# Patient Record
Sex: MALE | Race: ASIAN | NOT HISPANIC OR LATINO | URBAN - METROPOLITAN AREA
[De-identification: names, ages, dates, MRNs, and addresses within clinical notes are randomized per-mention and may not be internally consistent; named-entity substitution may affect disease eponyms.]

---

## 2021-07-19 ENCOUNTER — WEB ENCOUNTER (OUTPATIENT)
Dept: URBAN - METROPOLITAN AREA CLINIC 37 | Facility: CLINIC | Age: 66
End: 2021-07-19

## 2021-07-29 ENCOUNTER — OFFICE VISIT (OUTPATIENT)
Dept: URBAN - METROPOLITAN AREA CLINIC 35 | Facility: CLINIC | Age: 66
End: 2021-07-29

## 2021-07-29 VITALS
DIASTOLIC BLOOD PRESSURE: 80 MMHG | HEART RATE: 82 BPM | WEIGHT: 145 LBS | SYSTOLIC BLOOD PRESSURE: 130 MMHG | HEIGHT: 67 IN | BODY MASS INDEX: 22.76 KG/M2 | OXYGEN SATURATION: 98 %

## 2021-07-29 RX ORDER — ATORVASTATIN CALCIUM 40 MG/1
1 TABLET TABLET, FILM COATED ORAL ONCE A DAY
Qty: 30 | Status: ACTIVE | COMMUNITY

## 2021-07-29 RX ORDER — PANTOPRAZOLE SODIUM 40 MG/1
1 TABLET TABLET, DELAYED RELEASE ORAL ONCE A DAY
Qty: 30 | Status: ACTIVE | COMMUNITY

## 2021-07-29 RX ORDER — LOSARTAN POTASSIUM 50 MG/1
1 TABLET TABLET ORAL ONCE A DAY
Qty: 30 | Status: ACTIVE | COMMUNITY

## 2021-07-29 RX ORDER — MELOXICAM 15 MG/1
1 TABLET TABLET ORAL ONCE A DAY
Qty: 30 | Status: ON HOLD | COMMUNITY

## 2021-07-29 RX ORDER — SODIUM, POTASSIUM,MAG SULFATES 17.5-3.13G
ML AS DIRECTED SOLUTION, RECONSTITUTED, ORAL ORAL
Qty: 1 KIT | Refills: 0 | OUTPATIENT
Start: 2021-07-29

## 2021-07-29 RX ORDER — SUVOREXANT 20 MG/1
1 TABLET AT BEDTIME AS NEEDED TABLET, FILM COATED ORAL ONCE A DAY
Status: ACTIVE | COMMUNITY

## 2021-07-29 RX ORDER — KRILL/OM-3/DHA/EPA/PHOSPHO/AST 1000-230MG
1 TABLET CAPSULE ORAL ONCE A DAY
Qty: 30 | Status: ACTIVE | COMMUNITY

## 2021-07-29 NOTE — HPI-MIGRATED HPI
;   ;     Colorectal Cancer Screening : 66 y/o male patient presents today for a consultation for a colorectal cancer screening. Patient admits this is his  first colonoscopy due to age.  Patient currently admits 1 formed bowel movements per day with no melena, mucus  or blood in stools.  Patient denies abdominal pain, bloating, gas, or  heartburn.  Patient denies having a colonoscopy/EGD in past. Patient denies a family hx of colon, gastric, or esophageal cancer/polyps.  ;   Epigastric Pain : 66 y/o male patient presents today with intermittent epigastric pain. Patient notes that it occurs infrequently with burning sensation. Patient notes it coincides with excessive belching when it occurs. Patient admits taking Pantoprazole 40 mg with relief of symptoms. Patient admits to 1 formed bowel movements per day, with no mucus, melena, or blood in stool.  Patient denies bloating/gas, indigestion, or changes in bowel habits. Patient denies associated nausea, vomiting, fever, chills, dizziness,  dysphagia, globus, sour eructations,  early satiety, changes in appetite, coughing.  Patient denies past EGD performed. Patient denies any family history of colonic cancer/disease/polyps.  ;

## 2021-07-30 ENCOUNTER — TELEPHONE ENCOUNTER (OUTPATIENT)
Dept: URBAN - METROPOLITAN AREA CLINIC 35 | Facility: CLINIC | Age: 66
End: 2021-07-30

## 2021-08-03 ENCOUNTER — OFFICE VISIT (OUTPATIENT)
Dept: URBAN - METROPOLITAN AREA SURGERY CENTER 8 | Facility: SURGERY CENTER | Age: 66
End: 2021-08-03

## 2021-08-19 ENCOUNTER — OFFICE VISIT (OUTPATIENT)
Dept: URBAN - METROPOLITAN AREA CLINIC 35 | Facility: CLINIC | Age: 66
End: 2021-08-19

## 2021-09-08 ENCOUNTER — OFFICE VISIT (OUTPATIENT)
Dept: URBAN - METROPOLITAN AREA MEDICAL CENTER 10 | Facility: MEDICAL CENTER | Age: 66
End: 2021-09-08

## 2021-09-23 ENCOUNTER — OFFICE VISIT (OUTPATIENT)
Dept: URBAN - METROPOLITAN AREA CLINIC 35 | Facility: CLINIC | Age: 66
End: 2021-09-23

## 2021-09-23 ENCOUNTER — TELEPHONE ENCOUNTER (OUTPATIENT)
Dept: URBAN - METROPOLITAN AREA CLINIC 35 | Facility: CLINIC | Age: 66
End: 2021-09-23

## 2021-09-23 VITALS
SYSTOLIC BLOOD PRESSURE: 126 MMHG | HEIGHT: 67 IN | OXYGEN SATURATION: 99 % | DIASTOLIC BLOOD PRESSURE: 76 MMHG | HEART RATE: 83 BPM | BODY MASS INDEX: 22.91 KG/M2 | WEIGHT: 146 LBS

## 2021-09-23 PROBLEM — 196731005 GASTRODUODENITIS: Status: ACTIVE | Noted: 2021-09-23

## 2021-09-23 PROBLEM — 302914006 BARRETT'S ESOPHAGUS: Status: ACTIVE | Noted: 2021-09-23

## 2021-09-23 RX ORDER — PANTOPRAZOLE SODIUM 40 MG/1
1 TABLET TABLET, DELAYED RELEASE ORAL ONCE A DAY
Qty: 30 | Status: ACTIVE | COMMUNITY

## 2021-09-23 RX ORDER — ATORVASTATIN CALCIUM 40 MG/1
1 TABLET TABLET, FILM COATED ORAL ONCE A DAY
Qty: 30 | Status: ACTIVE | COMMUNITY

## 2021-09-23 RX ORDER — LOSARTAN POTASSIUM 50 MG/1
1 TABLET TABLET ORAL ONCE A DAY
Qty: 30 | Status: ACTIVE | COMMUNITY

## 2021-09-23 RX ORDER — METRONIDAZOLE 500 MG/1
1 TABLET TABLET, FILM COATED ORAL THREE TIMES A DAY
Qty: 42 TABLET | Refills: 0 | OUTPATIENT
Start: 2021-09-23

## 2021-09-23 RX ORDER — AMOXICILLIN 500 MG/1
2 CAPSULES CAPSULE ORAL TWICE A DAY
Qty: 56 CAPSULE | Refills: 0 | OUTPATIENT
Start: 2021-09-23

## 2021-09-23 RX ORDER — KRILL/OM-3/DHA/EPA/PHOSPHO/AST 1000-230MG
1 TABLET CAPSULE ORAL ONCE A DAY
Qty: 30 | Status: ACTIVE | COMMUNITY

## 2021-09-23 RX ORDER — OMEPRAZOLE 20 MG/1
1 CAPSULE 30 MINUTES BEFORE MORNING MEAL CAPSULE, DELAYED RELEASE ORAL TWICE A DAY
Qty: 60 | Refills: 0 | OUTPATIENT
Start: 2021-09-23

## 2021-09-23 RX ORDER — MELOXICAM 15 MG/1
1 TABLET TABLET ORAL ONCE A DAY
Qty: 30 | Status: ON HOLD | COMMUNITY

## 2021-09-23 RX ORDER — SUVOREXANT 20 MG/1
1 TABLET AT BEDTIME AS NEEDED TABLET, FILM COATED ORAL ONCE A DAY
Status: ACTIVE | COMMUNITY

## 2021-09-23 RX ORDER — SODIUM, POTASSIUM,MAG SULFATES 17.5-3.13G
ML AS DIRECTED SOLUTION, RECONSTITUTED, ORAL ORAL
Qty: 1 KIT | Refills: 0 | Status: ACTIVE | COMMUNITY
Start: 2021-07-29

## 2021-09-23 RX ORDER — CLARITHROMYCIN 500 MG/1
1 TABLET TABLET, FILM COATED ORAL
Qty: 28 TABLET | Refills: 0 | OUTPATIENT
Start: 2021-09-23

## 2021-09-23 RX ORDER — OMEPRAZOLE 40 MG/1
1 CAPSULE CAPSULE, DELAYED RELEASE ORAL
Qty: 30 | Refills: 6 | OUTPATIENT
Start: 2021-09-23

## 2021-09-23 NOTE — HPI-MIGRATED HPI
;   ;   ;   ;     Follow up- EGD : Patient presents today for follow up to his EGD which was completed on   (09/08/2021)  by Dr. Dario Sadler.  Patient denies any complications after his procedure. Since the procedure, the patient denies dysphagia, heartburn, globus, changes in appetite, abdomen pain and changes in bowel habits. Patient admits epigastric pain up until patient consumes food. Patient usually do not consume any food until 3pm. Patient drinks morning coffee.  EGD report shows:  -LA Grade B Reflux esophagitis, biopsied.  -Gastritis, biopsied -Normal examined duodenum, biopsied.   ;   Colonoscopy Follow-Up : Patient presents today for follow up to his colonoscopy which was completed on   (09/08/2021) by Dr. Dario Sadler.  Patient denies any complications after his/her procedure. Patient currently admits 1 formed bowel movements per day.  Patient denies any melena, blood or mucus in stools. Patient denies any associated abdominal pain, heartburn, bloating, or gas.   Colonoscopy report shows:  -Preparation of the colon was fair -Four 4 to 6mm polyps in the rectum, removed with a cold snare. Resected and retrieved.  -One 7 mm polyp in the distal descending colon, removed with a cold snare. Resected and retrieved.  -One 5mm polyp in the proximal descending colon, removed with a cold snare. Resected and retrieved.  -The examination was otherwise normal on direct and retroflexion views .  ;   Colorectal Cancer Screening : (Last visit 07/29/2021) 64 y/o male patient presents today for a consultation for a colorectal cancer screening. Patient admits this is his  first colonoscopy due to age.  Patient currently admits 1 formed bowel movements per day with no melena, mucus  or blood in stools.  Patient denies abdominal pain, bloating, gas, or  heartburn.  Patient denies having a colonoscopy/EGD in past. Patient denies a family hx of colon, gastric, or esophageal cancer/polyps.;   Epigastric Pain : 64 y/o male patient presents today with epigastric pain only when patient is on a empty stomach.  Patient states he normally does not eat until late afternoon. Patient describes that he does not feel hungry before 3pm.  The exacerbating factor is not eating. The relieving factor is when patient consumes food the patient feels better. Patient admits to 1 formed bowel movements per day, with no mucus, melena, or blood in stool.  Patient denies bloating/gas, indigestion, or changes in bowel habits. Patient denies associated nausea, vomiting, fever, chills, dizziness,  dysphagia, globus, sour eructations,  early satiety, changes in appetite, coughing.      (Last Visit 7/29/21) 64 y/o male patient presents today with intermittent epigastric pain. Patient notes that it occurs infrequently with burning sensation. Patient notes it coincides with excessive belching when it occurs. Patient admits taking Pantoprazole 40 mg with relief of symptoms. Patient admits to 1 formed bowel movements per day, with no mucus, melena, or blood in stool.  Patient denies bloating/gas, indigestion, or changes in bowel habits. Patient denies associated nausea, vomiting, fever, chills, dizziness,  dysphagia, globus, sour eructations,  early satiety, changes in appetite, coughing.  Patient denies past EGD performed. Patient denies any family history of colonic cancer/disease/polyps.;

## 2021-09-28 ENCOUNTER — TELEPHONE ENCOUNTER (OUTPATIENT)
Dept: URBAN - METROPOLITAN AREA CLINIC 35 | Facility: CLINIC | Age: 66
End: 2021-09-28

## 2021-09-28 RX ORDER — CLARITHROMYCIN 500 MG/1
1 TABLET TABLET, FILM COATED ORAL
Qty: 28 TABLET | Refills: 0 | OUTPATIENT
Start: 2021-09-23

## 2021-09-28 RX ORDER — OMEPRAZOLE 20 MG/1
1 CAPSULE 30 MINUTES BEFORE MORNING MEAL CAPSULE, DELAYED RELEASE ORAL TWICE A DAY
Qty: 28 | Refills: 0 | OUTPATIENT
Start: 2021-09-23

## 2021-09-28 RX ORDER — AMOXICILLIN 500 MG/1
2 CAPSULES CAPSULE ORAL TWICE A DAY
Qty: 56 CAPSULE | Refills: 0 | OUTPATIENT
Start: 2021-09-23

## 2022-03-17 ENCOUNTER — DASHBOARD ENCOUNTERS (OUTPATIENT)
Age: 67
End: 2022-03-17

## 2022-03-17 ENCOUNTER — OFFICE VISIT (OUTPATIENT)
Dept: URBAN - METROPOLITAN AREA CLINIC 35 | Facility: CLINIC | Age: 67
End: 2022-03-17
Payer: COMMERCIAL

## 2022-03-17 ENCOUNTER — LAB OUTSIDE AN ENCOUNTER (OUTPATIENT)
Dept: URBAN - METROPOLITAN AREA CLINIC 35 | Facility: CLINIC | Age: 67
End: 2022-03-17

## 2022-03-17 ENCOUNTER — TELEPHONE ENCOUNTER (OUTPATIENT)
Dept: URBAN - METROPOLITAN AREA CLINIC 36 | Facility: CLINIC | Age: 67
End: 2022-03-17

## 2022-03-17 VITALS
SYSTOLIC BLOOD PRESSURE: 130 MMHG | BODY MASS INDEX: 23.54 KG/M2 | HEIGHT: 67 IN | OXYGEN SATURATION: 96 % | HEART RATE: 76 BPM | WEIGHT: 150 LBS | DIASTOLIC BLOOD PRESSURE: 88 MMHG

## 2022-03-17 DIAGNOSIS — K22.70 BARRETT'S ESOPHAGUS WITHOUT DYSPLASIA: ICD-10-CM

## 2022-03-17 PROCEDURE — 99213 OFFICE O/P EST LOW 20 MIN: CPT | Performed by: INTERNAL MEDICINE

## 2022-03-17 RX ORDER — METRONIDAZOLE 500 MG/1
1 TABLET TABLET, FILM COATED ORAL THREE TIMES A DAY
Qty: 42 TABLET | Refills: 0 | Status: ON HOLD | COMMUNITY
Start: 2021-09-23

## 2022-03-17 RX ORDER — CLARITHROMYCIN 500 MG/1
1 TABLET TABLET, FILM COATED ORAL
Qty: 28 TABLET | Refills: 0 | Status: ON HOLD | COMMUNITY
Start: 2021-09-23

## 2022-03-17 RX ORDER — SUVOREXANT 20 MG/1
1 TABLET AT BEDTIME AS NEEDED TABLET, FILM COATED ORAL ONCE A DAY
Status: ACTIVE | COMMUNITY

## 2022-03-17 RX ORDER — LOSARTAN POTASSIUM 50 MG/1
1 TABLET TABLET ORAL ONCE A DAY
Qty: 30 | Status: ACTIVE | COMMUNITY

## 2022-03-17 RX ORDER — OMEPRAZOLE 20 MG/1
1 CAPSULE 30 MINUTES BEFORE MORNING MEAL CAPSULE, DELAYED RELEASE ORAL TWICE A DAY
Qty: 28 | Refills: 0 | Status: ON HOLD | COMMUNITY
Start: 2021-09-23

## 2022-03-17 RX ORDER — OMEPRAZOLE 40 MG/1
1 CAPSULE CAPSULE, DELAYED RELEASE ORAL
Qty: 30 | Refills: 6 | Status: ON HOLD | COMMUNITY
Start: 2021-09-23

## 2022-03-17 RX ORDER — ATORVASTATIN CALCIUM 40 MG/1
1 TABLET TABLET, FILM COATED ORAL ONCE A DAY
Qty: 30 | Status: ACTIVE | COMMUNITY

## 2022-03-17 RX ORDER — PANTOPRAZOLE SODIUM 40 MG/1
1 TABLET TABLET, DELAYED RELEASE ORAL ONCE A DAY
Qty: 30 | Status: ON HOLD | COMMUNITY

## 2022-03-17 RX ORDER — OMEPRAZOLE 40 MG/1
1 CAPSULE 30 MINUTES BEFORE MORNING MEAL CAPSULE, DELAYED RELEASE ORAL ONCE A DAY
Qty: 30 | Refills: 11 | OUTPATIENT
Start: 2022-03-17

## 2022-03-17 RX ORDER — MELOXICAM 15 MG/1
1 TABLET TABLET ORAL ONCE A DAY
Qty: 30 | Status: ON HOLD | COMMUNITY

## 2022-03-17 RX ORDER — AMOXICILLIN 500 MG/1
2 CAPSULES CAPSULE ORAL TWICE A DAY
Qty: 56 CAPSULE | Refills: 0 | Status: ON HOLD | COMMUNITY
Start: 2021-09-23

## 2022-03-17 RX ORDER — KRILL/OM-3/DHA/EPA/PHOSPHO/AST 1000-230MG
1 TABLET CAPSULE ORAL ONCE A DAY
Qty: 30 | Status: ACTIVE | COMMUNITY

## 2022-03-17 RX ORDER — SODIUM, POTASSIUM,MAG SULFATES 17.5-3.13G
ML AS DIRECTED SOLUTION, RECONSTITUTED, ORAL ORAL
Qty: 1 KIT | Refills: 0 | Status: ON HOLD | COMMUNITY
Start: 2021-07-29

## 2022-03-17 NOTE — HPI-EPIGASTRIC PAIN
Patient currently denies epigastric pain since the last visit.   Patient denies bloating/gas, indigestion, or changes in bowel habits. Patient denies associated nausea, vomiting, fever, chills, dizziness,  dysphagia, globus, sour eructations,  early satiety, changes in appetite, coughing.   Last visit (09/23/2021)   Epigastric Pain : 66 y/o male patient presents today with epigastric pain only when patient is on a empty stomach.  Patient states he normally does not eat until late afternoon. Patient describes that he does not feel hungry before 3pm.  The exacerbating factor is not eating. The relieving factor is when patient consumes food the patient feels better. Patient admits to 1 formed bowel movements per day, with no mucus, melena, or blood in stool.  Patient denies bloating/gas, indigestion, or changes in bowel habits. Patient denies associated nausea, vomiting, fever, chills, dizziness,  dysphagia, globus, sour eructations,  early satiety, changes in appetite, coughing.    (Last Visit 7/29/21) 66 y/o male patient presents today with intermittent epigastric pain. Patient notes that it occurs infrequently with burning sensation. Patient notes it coincides with excessive belching when it occurs. Patient admits taking Pantoprazole 40 mg with relief of symptoms. Patient admits to 1 formed bowel movements per day, with no mucus, melena, or blood in stool.  Patient denies bloating/gas, indigestion, or changes in bowel habits. Patient denies associated nausea, vomiting, fever, chills, dizziness,  dysphagia, globus, sour eructations,  early satiety, changes in appetite, coughing.  Patient denies past EGD performed. Patient denies any family history of colonic cancer/disease/polyps.

## 2022-03-17 NOTE — HPI-MIGRATED HPI
Last visit (09/23/2021) Follow up- EGD : Patient presents today for follow up to his EGD which was completed on   (09/08/2021)  by Dr. Dario Sadler.  Patient denies any complications after his procedure. Since the procedure, the patient denies dysphagia, heartburn, globus, changes in appetite, abdomen pain and changes in bowel habits. Patient admits epigastric pain up until patient consumes food. Patient usually do not consume any food until 3pm. Patient drinks morning coffee.  EGD report shows:  -LA Grade B Reflux esophagitis, biopsied.  -Gastritis, biopsied -Normal examined duodenum, biopsied.     Last visit (09/23/2021)   Colonoscopy Follow-Up : Patient presents today for follow up to his colonoscopy which was completed on   (09/08/2021) by Dr. Dario Sadler.  Patient denies any complications after his/her procedure. Patient currently admits 1 formed bowel movements per day.  Patient denies any melena, blood or mucus in stools. Patient denies any associated abdominal pain, heartburn, bloating, or gas.   Colonoscopy report shows:  -Preparation of the colon was fair -Four 4 to 6mm polyps in the rectum, removed with a cold snare. Resected and retrieved.  -One 7 mm polyp in the distal descending colon, removed with a cold snare. Resected and retrieved.  -One 5mm polyp in the proximal descending colon, removed with a cold snare. Resected and retrieved.  -The examination was otherwise normal on direct and retroflexion views .      Colorectal Cancer Screening : (Last visit 07/29/2021) 66 y/o male patient presents today for a consultation for a colorectal cancer screening. Patient admits this is his  first colonoscopy due to age.  Patient currently admits 1 formed bowel movements per day with no melena, mucus  or blood in stools.  Patient denies abdominal pain, bloating, gas, or  heartburn.  Patient denies having a colonoscopy/EGD in past. Patient denies a family hx of colon, gastric, or esophageal cancer/polyps.

## 2022-03-17 NOTE — HPI-BARRETT'S ESOPHAGUS
67 y/o male patient presents today for 6 month follow up of Tam's Esophagus and gastritis. Patient was last diagnosed on   (09/08/2021) by Dr. Dario Sadler.  Patient states that he stopped taking Omeprazole since he was treated with H pylori 6 months ago and notes that he was never prescribed the medication for refils.   Patient denies dyspepsia, dysphagia, excessive belching, globus, sour eructations, bloating/gas, early satiety, changes in appetite, coughing, abdominal/epigastric pain, or changes in bowel habits.

## 2022-03-29 ENCOUNTER — OFFICE VISIT (OUTPATIENT)
Dept: URBAN - METROPOLITAN AREA SURGERY CENTER 8 | Facility: SURGERY CENTER | Age: 67
End: 2022-03-29

## 2022-04-14 ENCOUNTER — TELEPHONE ENCOUNTER (OUTPATIENT)
Dept: URBAN - METROPOLITAN AREA CLINIC 35 | Facility: CLINIC | Age: 67
End: 2022-04-14

## 2022-04-14 ENCOUNTER — OFFICE VISIT (OUTPATIENT)
Dept: URBAN - METROPOLITAN AREA CLINIC 35 | Facility: CLINIC | Age: 67
End: 2022-04-14

## 2022-04-19 ENCOUNTER — OFFICE VISIT (OUTPATIENT)
Dept: URBAN - METROPOLITAN AREA SURGERY CENTER 8 | Facility: SURGERY CENTER | Age: 67
End: 2022-04-19

## 2022-05-04 PROBLEM — 302914006: Status: ACTIVE | Noted: 2022-03-17

## 2022-05-18 ENCOUNTER — OFFICE VISIT (OUTPATIENT)
Dept: URBAN - METROPOLITAN AREA MEDICAL CENTER 10 | Facility: MEDICAL CENTER | Age: 67
End: 2022-05-18
Payer: COMMERCIAL

## 2022-05-18 DIAGNOSIS — T18.2XXA FOREIGN BODY IN STOMACH: ICD-10-CM

## 2022-05-18 DIAGNOSIS — K22.70 BARRETT ESOPHAGUS: ICD-10-CM

## 2022-05-18 DIAGNOSIS — Z53.8 FAILED ATTEMPTED SURGICAL PROCEDURE: ICD-10-CM

## 2022-05-18 PROCEDURE — 43235 EGD DIAGNOSTIC BRUSH WASH: CPT | Performed by: INTERNAL MEDICINE

## 2022-06-09 ENCOUNTER — OFFICE VISIT (OUTPATIENT)
Dept: URBAN - METROPOLITAN AREA CLINIC 35 | Facility: CLINIC | Age: 67
End: 2022-06-09